# Patient Record
Sex: FEMALE | Race: BLACK OR AFRICAN AMERICAN | NOT HISPANIC OR LATINO | URBAN - METROPOLITAN AREA
[De-identification: names, ages, dates, MRNs, and addresses within clinical notes are randomized per-mention and may not be internally consistent; named-entity substitution may affect disease eponyms.]

---

## 2018-04-19 ENCOUNTER — OUTPATIENT (OUTPATIENT)
Dept: OUTPATIENT SERVICES | Facility: HOSPITAL | Age: 43
LOS: 1 days | End: 2018-04-19
Payer: MEDICAID

## 2018-04-19 VITALS
RESPIRATION RATE: 16 BRPM | HEART RATE: 86 BPM | WEIGHT: 233.91 LBS | DIASTOLIC BLOOD PRESSURE: 84 MMHG | TEMPERATURE: 99 F | HEIGHT: 67 IN | SYSTOLIC BLOOD PRESSURE: 147 MMHG

## 2018-04-19 DIAGNOSIS — Z01.818 ENCOUNTER FOR OTHER PREPROCEDURAL EXAMINATION: ICD-10-CM

## 2018-04-19 DIAGNOSIS — N93.8 OTHER SPECIFIED ABNORMAL UTERINE AND VAGINAL BLEEDING: ICD-10-CM

## 2018-04-19 DIAGNOSIS — D25.0 SUBMUCOUS LEIOMYOMA OF UTERUS: ICD-10-CM

## 2018-04-19 DIAGNOSIS — Z64.1 PROBLEMS RELATED TO MULTIPARITY: ICD-10-CM

## 2018-04-19 DIAGNOSIS — R94.31 ABNORMAL ELECTROCARDIOGRAM [ECG] [EKG]: ICD-10-CM

## 2018-04-19 DIAGNOSIS — Z98.890 OTHER SPECIFIED POSTPROCEDURAL STATES: Chronic | ICD-10-CM

## 2018-04-19 LAB
HCG SERPL-ACNC: <1 MIU/ML — SIGNIFICANT CHANGE UP
HCT VFR BLD CALC: 35.5 % — SIGNIFICANT CHANGE UP (ref 34.5–45)
HGB BLD-MCNC: 11.5 G/DL — SIGNIFICANT CHANGE UP (ref 11.5–15.5)
MCHC RBC-ENTMCNC: 26.5 PG — LOW (ref 27–34)
MCHC RBC-ENTMCNC: 32.5 GM/DL — SIGNIFICANT CHANGE UP (ref 32–36)
MCV RBC AUTO: 81.5 FL — SIGNIFICANT CHANGE UP (ref 80–100)
PLATELET # BLD AUTO: 373 K/UL — SIGNIFICANT CHANGE UP (ref 150–400)
RBC # BLD: 4.36 M/UL — SIGNIFICANT CHANGE UP (ref 3.8–5.2)
RBC # FLD: 12.7 % — SIGNIFICANT CHANGE UP (ref 10.3–14.5)
WBC # BLD: 6.6 K/UL — SIGNIFICANT CHANGE UP (ref 3.8–10.5)
WBC # FLD AUTO: 6.6 K/UL — SIGNIFICANT CHANGE UP (ref 3.8–10.5)

## 2018-04-19 PROCEDURE — 86850 RBC ANTIBODY SCREEN: CPT

## 2018-04-19 PROCEDURE — 86901 BLOOD TYPING SEROLOGIC RH(D): CPT

## 2018-04-19 PROCEDURE — 85027 COMPLETE CBC AUTOMATED: CPT

## 2018-04-19 PROCEDURE — 86900 BLOOD TYPING SEROLOGIC ABO: CPT

## 2018-04-19 PROCEDURE — 93005 ELECTROCARDIOGRAM TRACING: CPT

## 2018-04-19 PROCEDURE — 84702 CHORIONIC GONADOTROPIN TEST: CPT

## 2018-04-19 PROCEDURE — 93010 ELECTROCARDIOGRAM REPORT: CPT | Mod: NC

## 2018-04-19 PROCEDURE — G0463: CPT

## 2018-04-19 NOTE — H&P PST ADULT - NSANTHOSAYNRD_GEN_A_CORE
No. MAYA screening performed.  STOP BANG Legend: 0-2 = LOW Risk; 3-4 = INTERMEDIATE Risk; 5-8 = HIGH Risk

## 2018-04-19 NOTE — H&P PST ADULT - HISTORY OF PRESENT ILLNESS
42 y.o. female c/o heavy painful periods for over a year. Pelvic ultrasound revealed submucous leiomyoma of uterus. Patient is scheduled for Suction Dilation and Curettage, Hysteroscopy with Endometrial Ablation, Resection of Myoma (Symphion), Possible Bilateral Tubal Ligation.

## 2018-04-19 NOTE — H&P PST ADULT - PROBLEM SELECTOR PLAN 2
Sent for Medical Clearance, need to address abnormal EKG. As per pt, she had stress test done last year, which she stated was a part of an annual physical. She was told she has a mild heart murmur. Denies any cardiac symptoms.

## 2018-04-19 NOTE — H&P PST ADULT - PSH
H/O LEEP  2000  History of D&C  with removal of cervical polyp, 2008  History of lumpectomy of right breast  12/1996, benign

## 2018-04-19 NOTE — H&P PST ADULT - PROBLEM SELECTOR PLAN 1
Suction Dilation and Curettage, Hysteroscopy with Endometrial Ablation, Resection of Myoma (Symphion), Possible Bilateral Tubal Ligation.    Labs, EKG. Pre-op instructions reviewed and given. Take routine am meds DOS with sip of water. Avoid NSAIDs and OTC supplements. Verbalized understanding.

## 2018-04-27 RX ORDER — SODIUM CHLORIDE 9 MG/ML
1000 INJECTION, SOLUTION INTRAVENOUS
Qty: 0 | Refills: 0 | Status: DISCONTINUED | OUTPATIENT
Start: 2018-04-30 | End: 2018-04-30

## 2018-04-30 ENCOUNTER — OUTPATIENT (OUTPATIENT)
Dept: OUTPATIENT SERVICES | Facility: HOSPITAL | Age: 43
LOS: 1 days | End: 2018-04-30
Payer: MEDICAID

## 2018-04-30 VITALS
DIASTOLIC BLOOD PRESSURE: 83 MMHG | HEART RATE: 80 BPM | OXYGEN SATURATION: 100 % | SYSTOLIC BLOOD PRESSURE: 168 MMHG | HEIGHT: 67 IN | TEMPERATURE: 99 F | RESPIRATION RATE: 22 BRPM | WEIGHT: 233.91 LBS

## 2018-04-30 VITALS
RESPIRATION RATE: 14 BRPM | HEART RATE: 66 BPM | OXYGEN SATURATION: 95 % | SYSTOLIC BLOOD PRESSURE: 150 MMHG | DIASTOLIC BLOOD PRESSURE: 80 MMHG

## 2018-04-30 DIAGNOSIS — Z98.890 OTHER SPECIFIED POSTPROCEDURAL STATES: Chronic | ICD-10-CM

## 2018-04-30 DIAGNOSIS — N93.8 OTHER SPECIFIED ABNORMAL UTERINE AND VAGINAL BLEEDING: ICD-10-CM

## 2018-04-30 DIAGNOSIS — D25.0 SUBMUCOUS LEIOMYOMA OF UTERUS: ICD-10-CM

## 2018-04-30 DIAGNOSIS — Z01.818 ENCOUNTER FOR OTHER PREPROCEDURAL EXAMINATION: ICD-10-CM

## 2018-04-30 DIAGNOSIS — Z64.1 PROBLEMS RELATED TO MULTIPARITY: ICD-10-CM

## 2018-04-30 LAB — HCG UR QL: NEGATIVE — SIGNIFICANT CHANGE UP

## 2018-04-30 PROCEDURE — 88305 TISSUE EXAM BY PATHOLOGIST: CPT | Mod: 26

## 2018-04-30 PROCEDURE — 58563 HYSTEROSCOPY ABLATION: CPT

## 2018-04-30 PROCEDURE — 88305 TISSUE EXAM BY PATHOLOGIST: CPT

## 2018-04-30 PROCEDURE — 81025 URINE PREGNANCY TEST: CPT

## 2018-04-30 RX ORDER — LOSARTAN POTASSIUM 100 MG/1
1 TABLET, FILM COATED ORAL
Qty: 0 | Refills: 0 | COMMUNITY

## 2018-04-30 RX ORDER — OXYCODONE HYDROCHLORIDE 5 MG/1
10 TABLET ORAL ONCE
Qty: 0 | Refills: 0 | Status: DISCONTINUED | OUTPATIENT
Start: 2018-04-30 | End: 2018-04-30

## 2018-04-30 RX ORDER — SODIUM CHLORIDE 9 MG/ML
1000 INJECTION, SOLUTION INTRAVENOUS
Qty: 0 | Refills: 0 | Status: DISCONTINUED | OUTPATIENT
Start: 2018-04-30 | End: 2018-04-30

## 2018-04-30 RX ORDER — ACETAMINOPHEN 500 MG
650 TABLET ORAL ONCE
Qty: 0 | Refills: 0 | Status: COMPLETED | OUTPATIENT
Start: 2018-04-30 | End: 2018-04-30

## 2018-04-30 RX ORDER — HYDROMORPHONE HYDROCHLORIDE 2 MG/ML
0.5 INJECTION INTRAMUSCULAR; INTRAVENOUS; SUBCUTANEOUS
Qty: 0 | Refills: 0 | Status: DISCONTINUED | OUTPATIENT
Start: 2018-04-30 | End: 2018-04-30

## 2018-04-30 RX ORDER — LOSARTAN POTASSIUM 100 MG/1
50 TABLET, FILM COATED ORAL DAILY
Qty: 0 | Refills: 0 | Status: DISCONTINUED | OUTPATIENT
Start: 2018-04-30 | End: 2018-04-30

## 2018-04-30 RX ORDER — ONDANSETRON 8 MG/1
4 TABLET, FILM COATED ORAL ONCE
Qty: 0 | Refills: 0 | Status: DISCONTINUED | OUTPATIENT
Start: 2018-04-30 | End: 2018-04-30

## 2018-04-30 RX ORDER — HYDROMORPHONE HYDROCHLORIDE 2 MG/ML
0.5 INJECTION INTRAMUSCULAR; INTRAVENOUS; SUBCUTANEOUS ONCE
Qty: 0 | Refills: 0 | Status: DISCONTINUED | OUTPATIENT
Start: 2018-04-30 | End: 2018-04-30

## 2018-04-30 RX ORDER — OXYCODONE HYDROCHLORIDE 5 MG/1
5 TABLET ORAL ONCE
Qty: 0 | Refills: 0 | Status: DISCONTINUED | OUTPATIENT
Start: 2018-04-30 | End: 2018-04-30

## 2018-04-30 RX ORDER — METOCLOPRAMIDE HCL 10 MG
5 TABLET ORAL ONCE
Qty: 0 | Refills: 0 | Status: DISCONTINUED | OUTPATIENT
Start: 2018-04-30 | End: 2018-04-30

## 2018-04-30 RX ADMIN — HYDROMORPHONE HYDROCHLORIDE 0.5 MILLIGRAM(S): 2 INJECTION INTRAMUSCULAR; INTRAVENOUS; SUBCUTANEOUS at 13:32

## 2018-04-30 RX ADMIN — Medication 650 MILLIGRAM(S): at 10:01

## 2018-04-30 RX ADMIN — HYDROMORPHONE HYDROCHLORIDE 0.5 MILLIGRAM(S): 2 INJECTION INTRAMUSCULAR; INTRAVENOUS; SUBCUTANEOUS at 14:00

## 2018-04-30 RX ADMIN — SODIUM CHLORIDE 75 MILLILITER(S): 9 INJECTION, SOLUTION INTRAVENOUS at 13:50

## 2018-04-30 RX ADMIN — HYDROMORPHONE HYDROCHLORIDE 0.5 MILLIGRAM(S): 2 INJECTION INTRAMUSCULAR; INTRAVENOUS; SUBCUTANEOUS at 13:22

## 2018-04-30 RX ADMIN — SODIUM CHLORIDE 75 MILLILITER(S): 9 INJECTION, SOLUTION INTRAVENOUS at 10:01

## 2018-04-30 RX ADMIN — HYDROMORPHONE HYDROCHLORIDE 0.5 MILLIGRAM(S): 2 INJECTION INTRAMUSCULAR; INTRAVENOUS; SUBCUTANEOUS at 13:50

## 2018-04-30 NOTE — ASU DISCHARGE PLAN (ADULT/PEDIATRIC). - FOLLOWUP APPOINTMENT CLINIC/PHYSICIAN
RTO with Dr. Youssef in 2 weeks PRN RTO with Dr. Youssef in 2 weeks PRN  Notify for any temps or worsening bleeding or pain

## 2018-04-30 NOTE — ASU DISCHARGE PLAN (ADULT/PEDIATRIC). - NOTIFY
GYN Fever>100.4/Persistent Nausea and Vomiting/Unable to Urinate/Bleeding that does not stop/Pain not relieved by Medications

## 2018-04-30 NOTE — BRIEF OPERATIVE NOTE - PROCEDURE
<<-----Click on this checkbox to enter Procedure D&C (dilatation and curettage)  04/30/2018    Active  CARMITA  Ablation of endometrium with hysteroscopy  04/30/2018    Active  JADET

## 2018-04-30 NOTE — BRIEF OPERATIVE NOTE - PRE-OP DX
Intramural, submucous, and subserous leiomyoma of uterus  04/30/2018    Active  Domingo Youssef  Menorrhagia with regular cycle  04/30/2018    Active  Domingo Youssef

## 2018-04-30 NOTE — ASU DISCHARGE PLAN (ADULT/PEDIATRIC). - ACTIVITY LEVEL
nothing per vagina/nothing per rectum/no tub baths/no sports/gym/no douching/no intercourse/no heavy lifting/no tampons

## 2018-05-01 LAB — SURGICAL PATHOLOGY FINAL REPORT - CH: SIGNIFICANT CHANGE UP

## 2018-11-19 NOTE — ASU PREOP CHECKLIST - PATIENT SENT TO
"REHAB FOLLOWUP NOTE    Dennis Patricio is a 65 y.o. female patient.    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    History:  65-year-old female initially presented to emergency department with complaints of blurred vision, left-sided weakness, unsteady gait, confusion that began 2 days prior to this hospitalization.  Patient was initially seen in the emergency room 2 days prior, noted to have elevated blood pressures and was discharged home with medications.  Patient was readmitted to hospital this time with  inability to get up and walk to the bathroom, felt she could not move her left leg and walk,patient was also unable to  objects with the left hand and felt heavy and numb.  Patient had a CT scan consistent with right posterior acute ischemic infarct, MRI consistent with  right occipital parietal ischemic infarct, carotids without any significant stenosis, echocardiogram with no thrombus or right to left shunt noted.  Patient has been medically stabilized and is transferred down to us for further rehabilitation.        Past Medical History:   Diagnosis Date    Arthritis     Back pain, chronic     Diabetes mellitus     New in 2018    Hypertension        Temp: 99.2 °F (37.3 °C) (11/19/18 0808)  Pulse: 79 (11/19/18 0808)  Resp: 16 (11/19/18 0808)  BP: (!) 142/71 (11/19/18 0808)  SpO2: 97 % (11/19/18 0808)  Weight: 78.5 kg (173 lb) (11/18/18 0554)  Height: 5' 8" (172.7 cm) (11/15/18 1103)    Lab Results   Component Value Date    WBC 10.10 11/13/2018    HGB 12.5 11/13/2018    HCT 37.9 11/13/2018     (H) 11/13/2018    ALT 17 11/12/2018    AST 34 11/12/2018     11/14/2018    K 4.6 11/14/2018     11/14/2018    CREATININE 0.8 11/14/2018    BUN 14 11/14/2018    CO2 29 11/14/2018       Physical Examination:  Alert, oriented x3, voices no complaints.    Lungs are clear to auscultation.  Heart with regular rate and rhythm.  Bilateral lower " extremities without any edema or calf tenderness noted.  Left upper and lower extremity with decreased active to passive range of motion decreased motor strength.   Patient with urinary retention with Watkins catheter in place.  Will give a voiding trial.   Blood pressure and blood sugars controlled will monitor.    Impression:  Status post new onset right occipital parietal acute ischemic infarct with left hemiparesis.  Acute renal failure secondary to urinary retention.  Hypertension.  Diabetes mellitus.  Degenerative joint disease.      Recommendations:  Continue current PT, OT, speech therapy and nursing care.  DC Watkins catheter and a voiding trial.      Dominique Brink MD  11/19/2018   operating room

## 2025-05-06 NOTE — H&P PST ADULT - SOURCE OF INFORMATION, PROFILE
----- Message from Dr. Ayde Diaz MD sent at 5/5/2025  5:19 PM EDT -----  Regarding: FW: Anticoagulation referral    ----- Message -----  From: Ashley Correa formerly [facility]  Sent: 5/5/2025   3:48 PM EDT  To: Ayde Diaz MD  Subject: Anticoagulation referral                         Hello,    We have been managing this patient's warfarin at the [facility] per referral from her PCP. Since she sees you for cardiology now, we are requesting a referral from your office be placed at your convenience. She has been managed on warfarin for atrial fibrillation, INR goal 2-3, indefinite duration of therapy.    Thank you! Please let me know if you have questions or concerns.    Ashley Correa, PharmD  [facility]  5/5/2025 3:47 PM   patient